# Patient Record
Sex: FEMALE | Race: WHITE | NOT HISPANIC OR LATINO | Employment: OTHER | URBAN - METROPOLITAN AREA
[De-identification: names, ages, dates, MRNs, and addresses within clinical notes are randomized per-mention and may not be internally consistent; named-entity substitution may affect disease eponyms.]

---

## 2024-02-28 ENCOUNTER — TELEPHONE (OUTPATIENT)
Dept: GASTROENTEROLOGY | Facility: CLINIC | Age: 69
End: 2024-02-28
Payer: COMMERCIAL

## 2024-02-28 NOTE — TELEPHONE ENCOUNTER
----- Message from Francesca Esquivel sent at 2/28/2024  3:53 PM CST -----  Contact: Dr Cespedes-FAX  2/28/24    Referral scanned into media mgr, dx Gastroparesis- 1st available appt 06/10/2024- phone .[phone] (home)     Thanks    Francesca ATKINSON

## 2024-02-29 ENCOUNTER — TELEPHONE (OUTPATIENT)
Dept: GASTROENTEROLOGY | Facility: CLINIC | Age: 69
End: 2024-02-29
Payer: COMMERCIAL

## 2024-02-29 NOTE — TELEPHONE ENCOUNTER
----- Message from Kate Becerra sent at 2/29/2024 11:32 AM CST -----  Regarding: Returning missed call  Contact: Pt @ [phone]  Pt is returning a missed call from someone in the office and is asking for a return call back soon. Thanks.         Reason for call: to get scheduled

## 2024-04-16 ENCOUNTER — TELEPHONE (OUTPATIENT)
Dept: GASTROENTEROLOGY | Facility: CLINIC | Age: 69
End: 2024-04-16
Payer: COMMERCIAL

## 2024-04-16 NOTE — TELEPHONE ENCOUNTER
----- Message from Ana Lilia Domínguez sent at 4/16/2024 12:10 PM CDT -----  Regarding: want  Contact: @ [phone]  Pt called in regards to speaking with someone in regard to the  doctor and how qualify she is for the patient before she come out of town .....Please call and adv @ [phone]

## 2024-04-16 NOTE — TELEPHONE ENCOUNTER
Patient has a questions regarding what we may be able to do. I spoke with Dr Linn. Advised patient that we are happy to see her, but we were not sure that we would have anything different than her referring MD. Patient was very appreciative as she does not want to drive over 200 miles unless she feels we can had a different treatment or medications.

## 2024-04-29 ENCOUNTER — OFFICE VISIT (OUTPATIENT)
Dept: GASTROENTEROLOGY | Facility: CLINIC | Age: 69
End: 2024-04-29
Payer: MEDICARE

## 2024-04-29 ENCOUNTER — PATIENT MESSAGE (OUTPATIENT)
Dept: GASTROENTEROLOGY | Facility: CLINIC | Age: 69
End: 2024-04-29

## 2024-04-29 VITALS
DIASTOLIC BLOOD PRESSURE: 85 MMHG | BODY MASS INDEX: 27.63 KG/M2 | HEART RATE: 82 BPM | SYSTOLIC BLOOD PRESSURE: 124 MMHG | HEIGHT: 66 IN | WEIGHT: 171.94 LBS

## 2024-04-29 DIAGNOSIS — G89.29 CHRONIC ABDOMINAL PAIN: Primary | ICD-10-CM

## 2024-04-29 DIAGNOSIS — R10.9 CHRONIC ABDOMINAL PAIN: Primary | ICD-10-CM

## 2024-04-29 PROCEDURE — 99215 OFFICE O/P EST HI 40 MIN: CPT | Mod: PBBFAC | Performed by: STUDENT IN AN ORGANIZED HEALTH CARE EDUCATION/TRAINING PROGRAM

## 2024-04-29 PROCEDURE — 99999 PR PBB SHADOW E&M-EST. PATIENT-LVL V: CPT | Mod: PBBFAC,GC,, | Performed by: STUDENT IN AN ORGANIZED HEALTH CARE EDUCATION/TRAINING PROGRAM

## 2024-04-29 PROCEDURE — 99204 OFFICE O/P NEW MOD 45 MIN: CPT | Mod: S$PBB,GC,, | Performed by: STUDENT IN AN ORGANIZED HEALTH CARE EDUCATION/TRAINING PROGRAM

## 2024-04-29 RX ORDER — DIAZEPAM 5 MG/1
5 TABLET ORAL ONCE
Qty: 1 TABLET | Refills: 0 | Status: SHIPPED | OUTPATIENT
Start: 2024-04-29 | End: 2024-04-29

## 2024-04-29 NOTE — PROGRESS NOTES
"GENERAL GI PATIENT INTAKE:    COVID symptoms in the last 7 days (runny nose, sore throat, congestion, cough, fever): No  PCP: Elio Underwood  If not PCP-  number given to establish [phone]: N/A    ALLERGIES REVIEWED:  Yes    CHIEF COMPLAINT:    Chief Complaint   Patient presents with    GI Problem       VITAL SIGNS:  /85   Pulse 82   Ht 5' 6" (1.676 m)   Wt 78 kg (171 lb 15.3 oz)   BMI 27.75 kg/m²      Change in medical, surgical, family or social history: No      REVIEWED MEDICATION LIST RECONCILED INCLUDING ABOVE MEDS:  Yes       "

## 2024-04-30 ENCOUNTER — PATIENT MESSAGE (OUTPATIENT)
Dept: GASTROENTEROLOGY | Facility: CLINIC | Age: 69
End: 2024-04-30
Payer: COMMERCIAL

## 2024-05-01 ENCOUNTER — PATIENT MESSAGE (OUTPATIENT)
Dept: GASTROENTEROLOGY | Facility: CLINIC | Age: 69
End: 2024-05-01
Payer: COMMERCIAL

## 2024-05-02 ENCOUNTER — TELEPHONE (OUTPATIENT)
Dept: GASTROENTEROLOGY | Facility: CLINIC | Age: 69
End: 2024-05-02
Payer: COMMERCIAL

## 2024-05-02 NOTE — PROGRESS NOTES
[facility]    Reason for visit: The encounter diagnosis was Chronic abdominal pain.  Referring Provider/PCP: Elio Underwood MD    History of Present Illness:  Kerrie Kirkland is a 69 y.o. 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 She was previously following with Dr. Cespedes (GI) in [place], [place]. She has been hospitalized multiple times this year for ileus/SBO, most recently 3/2024 with an ileus. Her abdominal pain has become worse just within this last year, requiring frequent ED visits and hospitalizations. She was referred to a surgeon in [place] but due to her complex surgical history, it was recommended that she be evaluated at [facility] for a second opinion.     PEndoHx:  EGD 11/01/2019: small hiatal hernia, widely patent previous surgical anastomosis.   Colonoscopy 11/01/2019: one 1mm polyp in the ascending colon, non-bleeding internal hemorrhoids.    Review of Systems   Constitutional:  Negative for fever, malaise/fatigue and weight loss.   Gastrointestinal:  Positive for abdominal pain and constipation (chronic). Negative for diarrhea, nausea and vomiting.     Social History     Socioeconomic History    Marital status:    Tobacco Use    Smoking status: Former     Types: Cigarettes    Smokeless tobacco: Never    Tobacco comments:     quit 1986   Substance and Sexual Activity    Alcohol use: No     Alcohol/week: 0.0 standard drinks of alcohol    Drug use: No     Social Determinants of Health     Financial Resource Strain: Medium Risk (4/27/2024)    Overall Financial Resource Strain (CARDIA)     Difficulty of Paying Living Expenses: Somewhat hard    Food Insecurity: Food Insecurity Present (4/27/2024)    Hunger Vital Sign     Worried About Running Out of Food in the Last Year: Sometimes true     Ran Out of Food in the Last Year: Patient declined   Transportation Needs: No Transportation Needs (4/27/2024)    PRAPARE - Transportation     Lack of Transportation (Medical): No     Lack of Transportation (Non-Medical): No   Physical Activity: Unknown (4/27/2024)    Exercise Vital Sign     Days of Exercise per Week: 3 days   Recent Concern: Physical Activity - Insufficiently Active (3/5/2024)    [facility]    Exercise Vital Sign     Days of Exercise per Week: 3 days     Minutes of Exercise per Session: 40 min   Stress: Stress Concern Present (4/27/2024)    [employer] - Occupational Stress Questionnaire     Feeling of Stress : To some extent   Housing Stability: Unknown (4/27/2024)    Housing Stability Vital Sign     Unable to Pay for Housing in the Last Year: No       Current Outpatient Medications on File Prior to Visit   Medication Sig Dispense Refill    buPROPion (WELLBUTRIN SR) 150 MG TBSR 12 hr tablet Take 150 mg by mouth 2 (two) times daily.      duloxetine (CYMBALTA) 60 MG capsule Take 60 mg by mouth 2 (two) times daily.      HYDROmorphone (DILAUDID) 4 MG tablet Take 4 mg by mouth 2 (two) times daily.      levothyroxine (SYNTHROID) 25 MCG tablet Take 25 mcg by mouth once daily.      linaCLOtide (LINZESS) 145 mcg Cap capsule Take 72 mcg by mouth before breakfast.      LYRICA 150 mg capsule Take 150 mg by mouth 4 (four) times daily.  5    morphine (MS CONTIN) 60 MG 12 hr tablet Take 60 mg by mouth 2 (two) times daily.      morphine (MSIR) 15 MG tablet Take 15 mg by mouth 3 (three) times daily.      omeprazole (PRILOSEC) 40 MG capsule Take 40 mg by mouth once daily.      ondansetron (ZOFRAN) 4 MG tablet Take 4 mg by mouth daily as needed.      oxymorphone (OPANA ER) 20 MG 12 hr  "tablet Take 20 mg by mouth 2 (two) times daily.      pantoprazole (PROTONIX) 40 MG tablet Take 40 mg by mouth once daily.      paroxetine (PAXIL) 10 MG tablet Take 10 mg by mouth once daily.      tizanidine (ZANAFLEX) 4 MG tablet Take 4 mg by mouth 2 (two) times daily.       No current facility-administered medications on file prior to visit.       Review of patient's allergies indicates:   Allergen Reactions    Nubain [nalbuphine] Anaphylaxis    Reglan [metoclopramide] Anxiety and Other (See Comments)     Pt described 3 day "katatonic" state after receiving Reglan      Physical Exam  Vitals reviewed.   Constitutional:       General: She is not in acute distress.     Appearance: She is not ill-appearing.   Eyes:      Extraocular Movements: Extraocular movements intact.   Cardiovascular:      Rate and Rhythm: Normal rate.   Pulmonary:      Effort: Pulmonary effort is normal.   Abdominal:      General: There is no distension.   Skin:     General: Skin is warm.   Neurological:      Mental Status: She is alert and oriented to person, place, and time. Mental status is at baseline.       Laboratory:  Lab Results   Component Value Date     12/02/2015    K 3.5 12/02/2015     12/02/2015    CO2 26 12/02/2015    BUN 14 12/02/2015    CREATININE 0.9 12/02/2015    CALCIUM 9.3 12/02/2015    ANIONGAP 13 12/02/2015    ESTGFRAFRICA >60.0 12/02/2015    EGFRNONAA >60.0 12/02/2015       Lab Results   Component Value Date    ALT 26 12/02/2015    AST 25 12/02/2015    ALKPHOS 77 12/02/2015    BILITOT 0.5 12/02/2015    ALBUMIN 4.3 12/02/2015       Lab Results   Component Value Date    WBC 11.46 12/02/2015    HGB 14.9 12/02/2015    HCT 42.5 12/02/2015    MCV 88 12/02/2015     12/02/2015     Imaging:  Multiple previous abdominal imaging.   Most recent CTAP with IV contrast from 4/16/2024: Postoperative changes in the upper abdomen with a few loops of dilated small bowel but no gross obstruction. CBD dilated, similar to " prior. Not mentioned above, are several prominent central mesenteric nodes but these are not significantly changed from prior   CTE 1/11/2024: Postsurgical changes as above suggesting prior Jaspreet-en-Y gastric bypass. Prominent caliber and mildly dilated proximal small bowel with no evidence of acute bowel obstruction. These findings and incidental several prior CTs and are probably chronic. No   acute abnormality of the gastrointestinal tract identified by CT enterography.     Colonic diverticulosis.     Assessment:  Kerrie Kirkland is a 69 y.o. female with complicated past surgical history and multiple hospital admissions for SBO/ileus previously being followed by GI in [place], [place] who presents to GI clinic for a second opinion of her chronic abdominal pain. She has had extensive abdominal imaging and workup by her GI physician in [place]. Unfortunately it seems that her chronic pain will be a long term issue. From a GI standpoint, aside from additional imaging and potentially re-scoping, there is not much else to offer. She would like to be evaluated by bariatric surgery at [facility]. In terms of her constipation, Linzess previously worked well for her but she has not had a BM in a few days. She does not currently feel obstructed but with her surgical history, it is always a possibility. Will obtain additional abdominal imaging and potentially increase her Linzess if needed.     Problems:  Chronic abdominal pain  Chronic constipation on Linzess  Colon polyps  Hx of SBO and ileus      Plan:  MRI abdomen with and without contrast to further evaluate CBD dilation and pain.   Attempt to get images of prior EGD. If MRI negative, may need to repeat.   Due for repeat colonoscopy this year. Will await results of MRI as she may need an EGD along with her colonoscopy.     An Moreau MD  Gastroenterology Fellow    Orders Placed This Encounter   Procedures    MRI Abdomen W WO Contrast_INC MRCP (XPD)    Celiac Disease Panel      This patient was discussed with Dr. Linn.

## 2024-05-02 NOTE — TELEPHONE ENCOUNTER
----- Message from Ana Lilia Toscanoens sent at 5/2/2024 10:52 AM CDT -----  Contact: @ [phone]  Pt called in regards to that she spent the day in the ED for the stomach pain and she wants is there anything she can do instead of having to go to the ED other then the Linzess .....Please call and adv @ [phone] or portal she need a answer or she is looking for her MRI date

## 2024-05-02 NOTE — PROGRESS NOTES
I have seen the patient, reviewed An Moreau MD the GI fellow's history and physical, assessment, and plan. I have personally interviewed and examined the patient at bedside and I discussed the case with the GI fellow and I agree with the findings and plan as documented in the fellow's note.

## 2024-05-10 ENCOUNTER — PATIENT MESSAGE (OUTPATIENT)
Dept: GASTROENTEROLOGY | Facility: CLINIC | Age: 69
End: 2024-05-10
Payer: COMMERCIAL

## 2024-05-24 ENCOUNTER — TELEPHONE (OUTPATIENT)
Dept: GASTROENTEROLOGY | Facility: CLINIC | Age: 69
End: 2024-05-24
Payer: COMMERCIAL

## 2024-05-24 NOTE — TELEPHONE ENCOUNTER
----- Message from Angelica Kirill sent at 5/24/2024  8:50 AM CDT -----  Regarding: Advise  Contact: [phone]  Patient is calling stating that she would like to let the provider know that the pain is better managed now since she stopped the mortility drugs per pt. She also stated that she wants to know if the provider wants her to still have the MRI done that is scheduled on 5/29. Please give pt a call to further discuss.

## 2024-05-28 ENCOUNTER — TELEPHONE (OUTPATIENT)
Dept: GASTROENTEROLOGY | Facility: CLINIC | Age: 69
End: 2024-05-28
Payer: COMMERCIAL

## 2024-05-28 NOTE — TELEPHONE ENCOUNTER
----- Message from Angela Santiago sent at 5/28/2024  8:25 AM CDT -----  Contact: [phone]  PATIENTCALL     Pt is calling to speak with Justyna in provider office she  is asking for a return call please call.

## 2024-08-11 ENCOUNTER — PATIENT MESSAGE (OUTPATIENT)
Dept: GASTROENTEROLOGY | Facility: CLINIC | Age: 69
End: 2024-08-11
Payer: COMMERCIAL

## 2024-08-12 ENCOUNTER — TELEPHONE (OUTPATIENT)
Dept: GASTROENTEROLOGY | Facility: CLINIC | Age: 69
End: 2024-08-12
Payer: COMMERCIAL

## 2024-08-12 NOTE — TELEPHONE ENCOUNTER
----- Message from Kerrie Nunes sent at 8/12/2024  9:20 AM CDT -----  Regarding: appt  Contact: [phone]  Patient is calling to speak Petrona because she is calling to get her MRI rescheduled please contact patient at [phone]

## 2024-09-03 ENCOUNTER — TELEPHONE (OUTPATIENT)
Dept: GASTROENTEROLOGY | Facility: CLINIC | Age: 69
End: 2024-09-03
Payer: COMMERCIAL

## 2024-09-03 NOTE — TELEPHONE ENCOUNTER
Spoke with Geeta. Patient has not yet had the MRI, but will schedule soon. They will send the report.

## 2024-09-03 NOTE — TELEPHONE ENCOUNTER
----- Message from Trisha Huertavern Smith sent at 9/3/2024 11:44 AM CDT -----  Contact: Kris with [facility] [phone]  [email or web address]     Patient is calling for Medical Advice regarding:Kris with [facility] with Dr. Cespedes [phone] calling pt took a MRI at her office and they want to know if you can use that mri.  Please call her back and advise.    How long has patient had these symptoms:    Pharmacy name and phone#:    Patient wants a call back or thru [facility]:callback    Comments:    Please advise patient replies from provider may take up to 48 hours.

## 2024-09-23 ENCOUNTER — TELEPHONE (OUTPATIENT)
Dept: GASTROENTEROLOGY | Facility: CLINIC | Age: 69
End: 2024-09-23
Payer: MEDICARE

## 2024-09-23 NOTE — TELEPHONE ENCOUNTER
Did you get the results of the MRI she had done in [place]?  She is still having a lot of symptoms.

## 2024-09-26 ENCOUNTER — TELEPHONE (OUTPATIENT)
Dept: GASTROENTEROLOGY | Facility: CLINIC | Age: 69
End: 2024-09-26
Payer: MEDICARE

## 2024-09-26 NOTE — TELEPHONE ENCOUNTER
----- Message from Kylee Cevallos sent at 9/26/2024 11:12 AM CDT -----  Regarding: Pt called states she wld like to speak with Petrona regarding an appt  Contact: [phone]  Name of Who is Calling:ALEX MOISE [[id number]]        What is the request in detail:Pt called states she wld like to speak with Petrona regarding an appt . Please advise         Can the clinic reply by [facility]:No        What Number to Call Back if not in [facility]: Telephone Information:  Mobile          [phone]

## 2024-11-01 ENCOUNTER — TELEPHONE (OUTPATIENT)
Dept: GASTROENTEROLOGY | Facility: CLINIC | Age: 69
End: 2024-11-01
Payer: MEDICARE

## 2024-11-01 NOTE — TELEPHONE ENCOUNTER
She is having acute pain attacks. She says that she is having pain, nausea, etc. I advised ER. But she does not really want to go to the ER. She is desperate for a solution to her problem.     I suggested that she discuss with her Pain Management MD as well. She recently saw him, but did not mention the abd pain to him.    I encouraged her to consider going to ER and discuss with her Pain Management

## 2024-11-01 NOTE — TELEPHONE ENCOUNTER
----- Message from Summer sent at 11/1/2024 10:56 AM CDT -----  Same Day Appointment Request     Caller Is Requesting A Same Day Appointment      Caller Declined First Available Appointment? PT CALLED STATING THAT THE PAIN HAS BECOME UNBEARABLE AGAIN. I ADDED HER TO THE WAIT LIST. SHE'S ASKING TO BE SEEN SOONER      Best Call Back Number? [phone]    Additional Information:       Thank You

## 2024-12-02 ENCOUNTER — OFFICE VISIT (OUTPATIENT)
Dept: GASTROENTEROLOGY | Facility: CLINIC | Age: 69
End: 2024-12-02
Payer: MEDICARE

## 2024-12-02 DIAGNOSIS — R10.9 ABDOMINAL PAIN, UNSPECIFIED ABDOMINAL LOCATION: Primary | Chronic | ICD-10-CM

## 2024-12-02 PROCEDURE — 99213 OFFICE O/P EST LOW 20 MIN: CPT | Mod: PBBFAC | Performed by: STUDENT IN AN ORGANIZED HEALTH CARE EDUCATION/TRAINING PROGRAM

## 2024-12-02 PROCEDURE — 99214 OFFICE O/P EST MOD 30 MIN: CPT | Mod: S$PBB,GC,, | Performed by: STUDENT IN AN ORGANIZED HEALTH CARE EDUCATION/TRAINING PROGRAM

## 2024-12-02 PROCEDURE — 99999 PR PBB SHADOW E&M-EST. PATIENT-LVL III: CPT | Mod: PBBFAC,GC,, | Performed by: STUDENT IN AN ORGANIZED HEALTH CARE EDUCATION/TRAINING PROGRAM

## 2024-12-02 RX ORDER — OXYCODONE AND ACETAMINOPHEN 10; 325 MG/1; MG/1
1 TABLET ORAL EVERY 8 HOURS PRN
COMMUNITY

## 2024-12-02 NOTE — PROGRESS NOTES
[facility]    Reason for visit: The encounter diagnosis was Abdominal pain, unspecified abdominal location.  Referring Provider/PCP: Elio Underwood MD    History of Present Illness:  Kerrie Kirkland is a 69 y.o. 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     She was previously following with Dr. Cespedes (GI) in [place], [place]. She has been hospitalized multiple times this year for ileus/SBO, most recently 3/2024 with an ileus. Her abdominal pain has become worse just within this last year, requiring frequent ED visits and hospitalizations. She was referred to a surgeon in [place] but due to her complex surgical history, it was recommended that she be evaluated at [facility] for a second opinion.     Her initial visit was with me in 4/2024. Recommended an MRI and EGD/Colon if findings are negative. MRI was done but at an outside facility, and I am not able to review the results.     Since our last visit, she has continued to experience diffuse abdominal pain which is present more than 80% of the month and at times debilitating. She did recently seen a pain medicine physician but abdominal pain not mentioned at the time of visit. She does suffer with chronic constipation, for which she was taking both motegrity and linzess, now just linzess but not scheduled.      PEndoHx:  EGD 11/01/2019: small hiatal hernia, widely patent previous surgical anastomosis.   Colonoscopy 11/01/2019: one 1mm polyp in the ascending colon, non-bleeding internal hemorrhoids.     Review of Systems   Gastrointestinal:  Positive for abdominal pain.       Social History      Socioeconomic History    Marital status:    Tobacco Use    Smoking status: Former     Types: Cigarettes    Smokeless tobacco: Never    Tobacco comments:     quit 1986   Substance and Sexual Activity    Alcohol use: No     Alcohol/week: 0.0 standard drinks of alcohol    Drug use: No     Social Drivers of Health     Financial Resource Strain: Medium Risk (4/27/2024)    Overall Financial Resource Strain (CARDIA)     Difficulty of Paying Living Expenses: Somewhat hard   Food Insecurity: Food Insecurity Present (4/27/2024)    Hunger Vital Sign     Worried About Running Out of Food in the Last Year: Sometimes true     Ran Out of Food in the Last Year: Patient declined   Transportation Needs: No Transportation Needs (4/27/2024)    PRAPARE - Transportation     Lack of Transportation (Medical): No     Lack of Transportation (Non-Medical): No   Physical Activity: Unknown (4/27/2024)    Exercise Vital Sign     Days of Exercise per Week: 3 days   Recent Concern: Physical Activity - Insufficiently Active (3/5/2024)    [facility] from [facility]    Exercise Vital Sign     Days of Exercise per Week: 3 days     Minutes of Exercise per Session: 40 min   Stress: Stress Concern Present (4/27/2024)    [employer] - Occupational Stress Questionnaire     Feeling of Stress : To some extent   Housing Stability: Unknown (4/27/2024)    Housing Stability Vital Sign     Unable to Pay for Housing in the Last Year: No       Current Outpatient Medications on File Prior to Visit   Medication Sig Dispense Refill    levothyroxine (SYNTHROID) 25 MCG tablet Take 25 mcg by mouth once daily.      linaCLOtide (LINZESS) 145 mcg Cap capsule Take 72 mcg by mouth before breakfast.      ondansetron (ZOFRAN) 4 MG tablet Take 4 mg by mouth daily as needed.      oxyCODONE-acetaminophen (PERCOCET)  mg per tablet Take 1 tablet by mouth every 8 (eight) hours as needed for Pain.       "pantoprazole (PROTONIX) 40 MG tablet Take 40 mg by mouth once daily.      tizanidine (ZANAFLEX) 4 MG tablet Take 4 mg by mouth 2 (two) times daily.       No current facility-administered medications on file prior to visit.       Review of patient's allergies indicates:   Allergen Reactions    Nubain [nalbuphine] Anaphylaxis    Reglan [metoclopramide] Anxiety and Other (See Comments)     Pt described 3 day "katatonic" state after receiving Reglan      Physical Exam  Vitals reviewed.   Constitutional:       General: She is not in acute distress.     Appearance: She is not ill-appearing.   Abdominal:      General: There is no distension or abdominal tenderness. No guarding. Scars noted from prior abdominal surgeries.   Neurological:      Mental Status: She is alert and oriented to person, place, and time. Mental status is at baseline.     Laboratory:  Lab Results   Component Value Date     12/02/2015    K 3.5 12/02/2015     12/02/2015    CO2 26 12/02/2015    BUN 14 12/02/2015    CREATININE 0.9 12/02/2015    CALCIUM 9.3 12/02/2015    ANIONGAP 13 12/02/2015    ESTGFRAFRICA >60.0 12/02/2015    EGFRNONAA >60.0 12/02/2015       Lab Results   Component Value Date    ALT 26 12/02/2015    AST 25 12/02/2015    ALKPHOS 77 12/02/2015    BILITOT 0.5 12/02/2015    ALBUMIN 4.3 12/02/2015       Lab Results   Component Value Date    WBC 11.46 12/02/2015    HGB 14.9 12/02/2015    HCT 42.5 12/02/2015    MCV 88 12/02/2015     12/02/2015     Imaging:  Multiple previous abdominal imaging.   Most recent CTAP with IV contrast from 4/16/2024: Postoperative changes in the upper abdomen with a few loops of dilated small bowel but no gross obstruction. CBD dilated, similar to prior. Not mentioned above, are several prominent central mesenteric nodes but these are not significantly changed from prior   CTE 1/11/2024: Postsurgical changes as above suggesting prior Jaspreet-en-Y gastric bypass. Prominent caliber and mildly dilated " proximal small bowel with no evidence of acute bowel obstruction. These findings and incidental several prior CTs and are probably chronic. No acute abnormality of the gastrointestinal tract identified by CT enterography. Colonic diverticulosis.     Assessment:  Kerrie Kirkland is a 69 y.o. female complicated past surgical abdominal history and multiple hospital admissions for SBO/ileus previously being followed by GI in [place], [place] who presents for chronic abdominal pain. She has had extensive abdominal imaging and workup by her GI physician in [place]. Unfortunately it seems that her chronic pain will be a long term issue. In terms of her constipation, Linzess does work but she is not taking it consistently.     Problems:  Chronic abdominal pain  Chronic constipation    Plan:  Will obtain US doppler and urine PBG to rule out alternative etiologies of chronic abdominal pain.  Arrange outpatient colonoscopy.   Instructed to take Linzess daily for optimal results. If this fails,then consider switching to Movantik.    An Moreau MD  Gastroenterology Fellow    Orders Placed This Encounter   Procedures    US Mesenteric Ischemia Study (xpd)    PBG, urine     This patient was discussed with Dr. Chávez.

## 2024-12-03 ENCOUNTER — PATIENT MESSAGE (OUTPATIENT)
Dept: GASTROENTEROLOGY | Facility: CLINIC | Age: 69
End: 2024-12-03
Payer: MEDICARE

## 2024-12-04 ENCOUNTER — TELEPHONE (OUTPATIENT)
Dept: ENDOSCOPY | Facility: HOSPITAL | Age: 69
End: 2024-12-04
Payer: MEDICARE

## 2024-12-04 NOTE — TELEPHONE ENCOUNTER
----- Message from Diamond sent at 2024  8:12 AM CST -----    ----- Message -----  From: An Moreau MD  Sent: 2024   4:23 PM CST  To: [facility]    Procedure: Colonoscopy    Diagnosis: Surveillance colonoscopy - Hx of colon polyps    Procedure Timin-12 weeks    Provider: Any GI provider    Location: Hospital Based ([facility],  [facility], [facility])    Additional Scheduling Information: No scheduling concerns    Prep Specifications:Extended/Constipation prep    Have you attached a patient to this message: yes

## 2024-12-06 ENCOUNTER — TELEPHONE (OUTPATIENT)
Dept: GASTROENTEROLOGY | Facility: CLINIC | Age: 69
End: 2024-12-06
Payer: MEDICARE

## 2024-12-06 NOTE — TELEPHONE ENCOUNTER
----- Message from Latoya sent at 12/6/2024  2:03 PM CST -----  Regarding: Rx Request        Name Of Caller:   Kerrie      Contact Preference:  [phone]       Nature of call:   Pt requesting scripts for promethazine (Phenergan) and ondansetron (ZOFRAN) instant relief sent to pharmacy below. She would like Dr. Moreau to write the Rx's moving forward.      [facility] - [address] [address]  Phone: [phone] Fax: [phone]

## 2024-12-07 ENCOUNTER — PATIENT MESSAGE (OUTPATIENT)
Dept: GASTROENTEROLOGY | Facility: CLINIC | Age: 69
End: 2024-12-07
Payer: MEDICARE

## 2024-12-09 ENCOUNTER — HOSPITAL ENCOUNTER (OUTPATIENT)
Dept: RADIOLOGY | Facility: HOSPITAL | Age: 69
Discharge: HOME | End: 2024-12-09
Attending: STUDENT IN AN ORGANIZED HEALTH CARE EDUCATION/TRAINING PROGRAM
Payer: MEDICARE

## 2024-12-09 DIAGNOSIS — R10.9 STOMACH ACHE: Primary | ICD-10-CM

## 2024-12-09 DIAGNOSIS — R10.9 ABDOMINAL PAIN, UNSPECIFIED ABDOMINAL LOCATION: ICD-10-CM

## 2024-12-09 PROCEDURE — 93976 VASCULAR STUDY: CPT | Mod: TC,PO

## 2024-12-10 ENCOUNTER — PATIENT MESSAGE (OUTPATIENT)
Dept: GASTROENTEROLOGY | Facility: CLINIC | Age: 69
End: 2024-12-10
Payer: MEDICARE

## 2024-12-12 ENCOUNTER — TELEPHONE (OUTPATIENT)
Dept: ENDOSCOPY | Facility: HOSPITAL | Age: 69
End: 2024-12-12
Payer: MEDICARE

## 2024-12-12 DIAGNOSIS — R10.9 ABDOMINAL PAIN, UNSPECIFIED ABDOMINAL LOCATION: Primary | ICD-10-CM

## 2024-12-12 NOTE — TELEPHONE ENCOUNTER
----- Message from Diamond sent at 2024  8:12 AM CST -----    ----- Message -----  From: An Moreau MD  Sent: 2024   4:23 PM CST  To: [facility] Patients    Procedure: Colonoscopy    Diagnosis: Surveillance colonoscopy - Hx of colon polyps    Procedure Timin-12 weeks    Provider: Any GI provider    Location: Hospital Based ([facility],  [facility], [facility])    Additional Scheduling Information: No scheduling concerns    Prep Specifications:Extended/Constipation prep    Have you attached a patient to this message: yes

## 2024-12-20 ENCOUNTER — TELEPHONE (OUTPATIENT)
Dept: ENDOSCOPY | Facility: HOSPITAL | Age: 69
End: 2024-12-20
Payer: MEDICARE

## 2024-12-20 VITALS — WEIGHT: 171 LBS | HEIGHT: 66 IN | BODY MASS INDEX: 27.48 KG/M2

## 2024-12-20 DIAGNOSIS — Z86.0100 HISTORY OF COLON POLYPS: Primary | ICD-10-CM

## 2024-12-20 DIAGNOSIS — Z12.11 ENCOUNTER FOR SCREENING COLONOSCOPY: Primary | ICD-10-CM

## 2024-12-20 RX ORDER — POLYETHYLENE GLYCOL 3350, SODIUM SULFATE ANHYDROUS, SODIUM BICARBONATE, SODIUM CHLORIDE, POTASSIUM CHLORIDE 236; 22.74; 6.74; 5.86; 2.97 G/4L; G/4L; G/4L; G/4L; G/4L
8 POWDER, FOR SOLUTION ORAL ONCE
Qty: 8000 ML | Refills: 0 | Status: SHIPPED | OUTPATIENT
Start: 2024-12-20 | End: 2024-12-20

## 2024-12-20 NOTE — TELEPHONE ENCOUNTER
Referral for procedure from Telephone call - direct access patient      Spoke to patient to schedule procedure(s) Colonoscopy       Physician to perform procedure(s) Dr. TIKA Lyle  Date of Procedure (s) 2/14/25  Arrival Time 10:00 AM  Time of Procedure(s) 11:00 AM   Location of Procedure(s) [place] 4th Floor  Type of Rx Prep sent to patient: PEG/EXTENDED  Instructions provided to patient via [facility]    Patient was informed on the following information and verbalized understanding. Screening questionnaire reviewed with patient and complete. If procedure requires anesthesia, a responsible adult needs to be present to accompany the patient home, patient cannot drive after receiving anesthesia. Appointment details are tentative, especially check-in time. Patient will receive a prep-op call 7 days prior to confirm check-in time for procedure. If applicable the patient should contact their pharmacy to verify Rx for procedure prep is ready for pick-up. Patient was advised to call the scheduling department at [phone] if pharmacy states no Rx is available. Patient was advised to call the endoscopy scheduling department if any questions or concerns arise.      [facility] Endoscopy Scheduling Department

## 2024-12-23 ENCOUNTER — PATIENT MESSAGE (OUTPATIENT)
Dept: GASTROENTEROLOGY | Facility: CLINIC | Age: 69
End: 2024-12-23
Payer: MEDICARE

## 2025-01-21 ENCOUNTER — TELEPHONE (OUTPATIENT)
Dept: ENDOSCOPY | Facility: HOSPITAL | Age: 70
End: 2025-01-21
Payer: MEDICARE
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
## 2025-01-24 ENCOUNTER — TELEPHONE (OUTPATIENT)
Dept: ENDOSCOPY | Facility: HOSPITAL | Age: 70
End: 2025-01-24
Payer: MEDICARE

## 2025-01-24 RX ORDER — DEXLANSOPRAZOLE 60 MG/1
CAPSULE, DELAYED RELEASE ORAL
COMMUNITY

## 2025-01-24 RX ORDER — ONDANSETRON 4 MG/1
2 TABLET, ORALLY DISINTEGRATING ORAL EVERY 6 HOURS PRN
COMMUNITY
Start: 2024-12-30

## 2025-01-24 RX ORDER — ALPRAZOLAM 0.5 MG/1
TABLET ORAL
COMMUNITY
Start: 2024-08-20

## 2025-01-24 RX ORDER — NAPROXEN SODIUM 220 MG
TABLET ORAL
COMMUNITY

## 2025-01-24 RX ORDER — LISINOPRIL 20 MG/1
TABLET ORAL
COMMUNITY

## 2025-01-24 RX ORDER — BUPROPION HYDROCHLORIDE 150 MG/1
1 TABLET, EXTENDED RELEASE ORAL 2 TIMES DAILY
COMMUNITY

## 2025-01-24 RX ORDER — PHENYTOIN SODIUM 100 MG/1
CAPSULE, EXTENDED RELEASE ORAL
COMMUNITY

## 2025-01-24 RX ORDER — AMLODIPINE BESYLATE 5 MG/1
TABLET ORAL
COMMUNITY

## 2025-01-24 RX ORDER — NABUMETONE 500 MG/1
500 TABLET, FILM COATED ORAL 2 TIMES DAILY PRN
COMMUNITY

## 2025-01-24 RX ORDER — AZELASTINE 1 MG/ML
SPRAY, METERED NASAL
COMMUNITY

## 2025-01-24 RX ORDER — CARBAMAZEPINE 200 MG/1
TABLET ORAL
COMMUNITY

## 2025-01-24 RX ORDER — LOSARTAN POTASSIUM 25 MG/1
1 TABLET ORAL EVERY MORNING
COMMUNITY

## 2025-01-24 RX ORDER — ALBUTEROL SULFATE 90 UG/1
INHALANT RESPIRATORY (INHALATION)
COMMUNITY

## 2025-01-24 RX ORDER — MIRTAZAPINE 7.5 MG/1
TABLET, FILM COATED ORAL
COMMUNITY

## 2025-01-24 RX ORDER — GABAPENTIN 600 MG/1
TABLET ORAL
COMMUNITY

## 2025-01-24 RX ORDER — AMOXICILLIN 250 MG
1 CAPSULE ORAL 2 TIMES DAILY
COMMUNITY
Start: 2024-02-28 | End: 2025-02-27

## 2025-01-24 RX ORDER — POLYETHYLENE GLYCOL 3350, SODIUM SULFATE ANHYDROUS, SODIUM BICARBONATE, SODIUM CHLORIDE, POTASSIUM CHLORIDE 236; 22.74; 6.74; 5.86; 2.97 G/4L; G/4L; G/4L; G/4L; G/4L
POWDER, FOR SOLUTION ORAL
COMMUNITY
Start: 2024-12-20

## 2025-01-24 RX ORDER — NIZATIDINE 300 MG/1
CAPSULE ORAL
COMMUNITY

## 2025-01-24 RX ORDER — METHOCARBAMOL 500 MG/1
TABLET, FILM COATED ORAL
COMMUNITY

## 2025-01-24 RX ORDER — DAPAGLIFLOZIN 10 MG/1
10 TABLET, FILM COATED ORAL
COMMUNITY
Start: 2023-12-29

## 2025-01-24 RX ORDER — CELECOXIB 200 MG/1
CAPSULE ORAL
COMMUNITY

## 2025-01-24 RX ORDER — DULOXETIN HYDROCHLORIDE 60 MG/1
1 CAPSULE, DELAYED RELEASE ORAL DAILY
COMMUNITY

## 2025-01-24 RX ORDER — AMITRIPTYLINE HYDROCHLORIDE 50 MG/1
TABLET, FILM COATED ORAL
COMMUNITY
Start: 2024-09-16

## 2025-01-24 RX ORDER — HYDRALAZINE HYDROCHLORIDE 10 MG/1
10 TABLET, FILM COATED ORAL
COMMUNITY
Start: 2023-12-29

## 2025-01-24 RX ORDER — POTASSIUM CHLORIDE 750 MG/1
CAPSULE, EXTENDED RELEASE ORAL
COMMUNITY

## 2025-01-24 NOTE — TELEPHONE ENCOUNTER
Referral for procedure from [facility] (see t/e 12/20/25)      Spoke to pt to reschedule procedure(s) Colonoscopy (moved to hospital-based location per MD order)       Physician to perform procedure(s) Dr. ERIN Parrish  Date of Procedure (s) 3/10/25  Arrival Time 12:15 PM  Time of Procedure(s) 1:15 PM   Location of Procedure(s) [address]  Type of Rx Prep sent to patient: PEG extended  Instructions provided to patient via [facility]    Patient was informed on the following information and verbalized understanding. Screening questionnaire reviewed with patient and complete. If procedure requires anesthesia, a responsible adult needs to be present to accompany the patient home, patient cannot drive after receiving anesthesia. Appointment details are tentative, especially check-in time. Patient will receive a prep-op call 7 days prior to confirm check-in time for procedure. If applicable the patient should contact their pharmacy to verify Rx for procedure prep is ready for pick-up. Patient was advised to call the scheduling department at [phone] if pharmacy states no Rx is available. Patient was advised to call the endoscopy scheduling department if any questions or concerns arise.       Endoscopy Scheduling Department    
61

## 2025-01-28 ENCOUNTER — PATIENT MESSAGE (OUTPATIENT)
Dept: GASTROENTEROLOGY | Facility: CLINIC | Age: 70
End: 2025-01-28
Payer: MEDICARE

## 2025-02-06 ENCOUNTER — TELEPHONE (OUTPATIENT)
Dept: GASTROENTEROLOGY | Facility: CLINIC | Age: 70
End: 2025-02-06
Payer: MEDICARE

## 2025-02-06 NOTE — TELEPHONE ENCOUNTER
----- Message from Samia sent at 2/6/2025 12:09 PM CST -----  Regarding: Refill Request  Contact: Kerrie  REFILL REQUEST    Who Called: Kerrie     Refill or New Rx: Refill     Rx Name and Strength:linaCLOtide (LINZESS) 145 mcg Cap capsule  And   Phenergan   And  amitriptyline (ELAVIL) 50 MG tablet    Preferred Pharmacy with phone number:    [facility] [address] [address]  Phone: [phone] Fax: [phone]    Local or Mail Order: Local     Would the patient rather a call back or a response via [facility]? Both       Pt states Afraid of the drug but going through a hard time right now. Ongoing stomach issues and won't be taking it regularly but will take it when she needs it   amitriptyline (ELAVIL) 50 MG tablet

## 2025-02-07 ENCOUNTER — PATIENT MESSAGE (OUTPATIENT)
Dept: GASTROENTEROLOGY | Facility: CLINIC | Age: 70
End: 2025-02-07
Payer: MEDICARE

## 2025-02-07 RX ORDER — AMITRIPTYLINE HYDROCHLORIDE 50 MG/1
50 TABLET, FILM COATED ORAL NIGHTLY
Qty: 90 TABLET | Refills: 0 | Status: SHIPPED | OUTPATIENT
Start: 2025-02-07 | End: 2025-05-08

## 2025-02-10 ENCOUNTER — TELEPHONE (OUTPATIENT)
Dept: GASTROENTEROLOGY | Facility: CLINIC | Age: 70
End: 2025-02-10
Payer: MEDICARE

## 2025-02-10 NOTE — TELEPHONE ENCOUNTER
----- Message from Christin sent at 2/10/2025  4:18 PM CST -----  Regarding: PT ADVICE  Contact: PT@[phone]  Pt is returning a missed call from someone in the office and is asking for a return call back soon. Thanks.     Reason for call:MISS CALL      Patient's DX:     Patient requesting call back or [facility] msg: PT@[phone]

## 2025-02-10 NOTE — TELEPHONE ENCOUNTER
----- Message from Angela sent at 2/10/2025  8:29 AM CST -----  Type:  RX Refill Request    Who Called: Ms Bosch friend of Ms Sy   Refill or New Rx:refill   RX Name and Strength:Phenergan    Preferred Pharmacy with phone number:  [facility] - [place], [place] - [address] [place] [address]  Phone: [phone] Fax: [phone]  Local or Mail Order:local   Ordering Provider:Dr Lehman  Would the patient rather a call back or a response via [facility]? Call   Best Call Back Number: [phone]  Additional Information:

## 2025-02-11 RX ORDER — PROMETHAZINE HYDROCHLORIDE 25 MG/1
25 TABLET ORAL EVERY 6 HOURS PRN
Qty: 30 TABLET | Refills: 0 | Status: SHIPPED | OUTPATIENT
Start: 2025-02-11 | End: 2025-03-13

## 2025-02-19 ENCOUNTER — TELEPHONE (OUTPATIENT)
Dept: GASTROENTEROLOGY | Facility: CLINIC | Age: 70
End: 2025-02-19
Payer: MEDICARE

## 2025-02-19 NOTE — TELEPHONE ENCOUNTER
----- Message from Summer sent at 2/19/2025  8:39 AM CST -----  .Type:  Patient Call BackWho Called: PT Does the patient know what this is regarding?: PT WOULD LIKE TO SPEAK WITH MO COLE ABOUT TEST RESULTS AND SOME OTHER THINGS THAT WILL BE DISCUSSED DURING THE RETURNED CALL TO HER.  Would the patient rather a call back YES Best Call Back Number: [phone] Information: Thank You

## 2025-02-19 NOTE — TELEPHONE ENCOUNTER
FYI-She is going to fax some labs to you.    She is concerned about dehydration with her upcoming procedure.

## 2025-02-20 NOTE — TELEPHONE ENCOUNTER
----- Message from Michelle sent at 2/20/2025  9:06 AM CST -----  Regarding: pt advice  Contact: [phone]  Patient requesting a callback, patient did not want to disclose reason for call. [phone] Calling to speak to Petrona

## 2025-02-20 NOTE — TELEPHONE ENCOUNTER
Wants to pursue every avenue to determine why she is having pain.  She wants to every test that you deem necessary.

## 2025-02-21 ENCOUNTER — PATIENT MESSAGE (OUTPATIENT)
Dept: GASTROENTEROLOGY | Facility: CLINIC | Age: 70
End: 2025-02-21
Payer: MEDICARE

## 2025-02-25 ENCOUNTER — TELEPHONE (OUTPATIENT)
Dept: GASTROENTEROLOGY | Facility: CLINIC | Age: 70
End: 2025-02-25
Payer: MEDICARE

## 2025-02-25 NOTE — TELEPHONE ENCOUNTER
I spoke with patient. CTA is scheduled.     She wants to know what is the alternative plan is she is not able to tolerate the colon prep. She says it is critical that she know what the option is so she can cancel the hotel room.

## 2025-02-25 NOTE — TELEPHONE ENCOUNTER
----- Message from Angela sent at 2/25/2025  9:13 AM CST -----  Type:  Patient Returning CallWho Called:Ms Sy Who Left Message for Patient:Wilfredo the patient know what this is regarding?:yes Would the patient rather a call back or a response via [facility]? Call Best Call Back Number: [phone] Information: please call back

## 2025-02-26 NOTE — TELEPHONE ENCOUNTER
----- Message from Samia sent at 2/26/2025 10:44 AM CST -----  Regarding: Consult Advisory  Contact: Kerrie  CONSULT/ADVISORYName of Caller: Haider Preference:  [phone] of CallPt called pharmacy to see if they they had the prep for the upcoming procedure. They haven't received anything. Pt needs you all to send it over so that she may retrieve it in order to prep for procedure.  Please give pt a call when the prep is put in.[facility] - [place], [place] - [address] [place] [address]: [phone] Fax: [phone]

## 2025-02-26 NOTE — TELEPHONE ENCOUNTER
----- Message from Summer sent at 2/26/2025 11:22 AM CST -----  Patient Returning CallWho Called? PT Who Left Message for Patient? MO MCGUIRE TDoes the patient know what this is regarding?: MISSED CALL Would the patient rather a call back?  PLEASE SEE CHART MESSAGE  Best Call Back Number:  [phone] Information:  THANK YOU

## 2025-03-05 ENCOUNTER — TELEPHONE (OUTPATIENT)
Dept: GASTROENTEROLOGY | Facility: CLINIC | Age: 70
End: 2025-03-05
Payer: MEDICARE

## 2025-03-05 NOTE — TELEPHONE ENCOUNTER
----- Message from Jaya sent at 3/5/2025  9:30 AM CST -----  Regarding: Rescheduling Request  Contact: [phone]  Rescheduling RequestAppt Type:  EpDate/Time Preference: Next availableCaller Name:PtContact Preference: [phone] Comment: Pt currently in hospitalPlease Call to Advise,Thank you.

## 2025-03-10 ENCOUNTER — TELEPHONE (OUTPATIENT)
Dept: ENDOSCOPY | Facility: HOSPITAL | Age: 70
End: 2025-03-10
Payer: MEDICARE

## 2025-03-10 NOTE — TELEPHONE ENCOUNTER
Contacted patient to reschedule colonoscopy.  Patient is not available to talk at this time and would like to call back.  Direct line provided.

## 2025-03-19 ENCOUNTER — HOSPITAL ENCOUNTER (OUTPATIENT)
Dept: RADIOLOGY | Facility: HOSPITAL | Age: 70
Discharge: HOME | End: 2025-03-19
Attending: STUDENT IN AN ORGANIZED HEALTH CARE EDUCATION/TRAINING PROGRAM
Payer: MEDICARE

## 2025-03-19 DIAGNOSIS — R10.9 ABDOMINAL PAIN, UNSPECIFIED ABDOMINAL LOCATION: ICD-10-CM

## 2025-03-19 LAB
CREAT SERPL-MCNC: 0.7 MG/DL (ref 0.5–1.4)
SAMPLE: NORMAL

## 2025-03-19 PROCEDURE — 25500020 PHARM REV CODE 255: Performed by: STUDENT IN AN ORGANIZED HEALTH CARE EDUCATION/TRAINING PROGRAM

## 2025-03-19 PROCEDURE — 74174 CTA ABD&PLVS W/CONTRAST: CPT | Mod: TC

## 2025-03-19 PROCEDURE — 74174 CTA ABD&PLVS W/CONTRAST: CPT | Mod: 26,,, | Performed by: RADIOLOGY

## 2025-03-19 RX ADMIN — IOHEXOL 80 ML: 350 INJECTION, SOLUTION INTRAVENOUS at 04:03

## 2025-03-19 NOTE — PLAN OF CARE
Radiology Contrast Extravasation:    During power injection of contrast for a CTA of the abdomen and pelvis, approximately 70 cc of Omnipaque 350 contrast extravasated into the soft tissues of the pt's left upper extremity. At the time of examination, the pt remained neurovascularly intact with no pain at the site of extravasation. No evidence for compartment syndrome. Patient reports she has had these contrast extravasations occur in the past.     Patient educated on the typical course of contrast extravasations. Advised to elevate the extremity as tolerated. Strict return precautions provided, including descriptions of compartment syndrome symptoms.     Jacob Muñiz MD PGY4  Department of Radiology  [facility]

## 2025-03-24 ENCOUNTER — RESULTS FOLLOW-UP (OUTPATIENT)
Dept: GASTROENTEROLOGY | Facility: HOSPITAL | Age: 70
End: 2025-03-24

## 2025-03-27 ENCOUNTER — PATIENT MESSAGE (OUTPATIENT)
Dept: GASTROENTEROLOGY | Facility: CLINIC | Age: 70
End: 2025-03-27
Payer: MEDICARE

## 2025-03-27 ENCOUNTER — TELEPHONE (OUTPATIENT)
Dept: GASTROENTEROLOGY | Facility: CLINIC | Age: 70
End: 2025-03-27
Payer: MEDICARE

## 2025-03-27 NOTE — TELEPHONE ENCOUNTER
----- Message from Jaya sent at 3/27/2025  1:54 PM CDT -----  Regarding: Consult/Advisory/Wrong Imaging  Contact: [phone]  Consult/Advisory/Wrong ImagingName Of Caller: AnaidParkerBaylor University Medical CenterContact Preference:[phone] of call: Pt believes her CTA Pelvis Imaging done on 03/20 , Gave to [facility]. Pt believes the images are not hers, due to the imaging findings stating she had had a cabbage done(which hasn't ever been done), she also never had a bypass surgery or a hernia surgery done,and it states she has left atrium enlargement . Pt states she does have a disformity but never been told there was a enlargement , also post op change due to the cabbage but she has never had the surgery done. The findings also Shows she has a normal gallbladder on the report but her gall bladder was removed . Based off the finding she is be referred to a pulmonologist to get a CT of her Chest but she thinks this is the wrong imaging, pt has tried to contact provider's office , administrative and imaging and no one has tried to contact her. Pt is extremely upset and is requesting someone giver her a callPlease Call to Advise,Thank you.

## 2025-03-27 NOTE — TELEPHONE ENCOUNTER
Copied from CRM #[id number]. Topic: General Inquiry - Information Request  >> Mar 27, 2025 11:00 AM Christin wrote:  Pt is requesting a  call from someone in the office and is asking for a return call back soon. Thanks.     Reason for call:discuss plan of care      Patient's DX:     Patient requesting call back or [facility] [phone]

## 2025-03-27 NOTE — TELEPHONE ENCOUNTER
Spoke with nurse at [facility]. Radiology is looking at the report and will do an addendum.

## 2025-03-27 NOTE — TELEPHONE ENCOUNTER
----- Message from Natasha sent at 3/27/2025  4:05 PM CDT -----  Contact: Anaid @ [phone] ext [phone]  Anaid from [facility] calling regarding Patient Advice (message) for #is calling to speak with Petrona concerning pt, asking for call back

## 2025-04-14 ENCOUNTER — PATIENT MESSAGE (OUTPATIENT)
Dept: GASTROENTEROLOGY | Facility: CLINIC | Age: 70
End: 2025-04-14
Payer: MEDICARE

## 2025-04-17 ENCOUNTER — TELEPHONE (OUTPATIENT)
Dept: GASTROENTEROLOGY | Facility: CLINIC | Age: 70
End: 2025-04-17
Payer: MEDICARE

## 2025-04-17 NOTE — TELEPHONE ENCOUNTER
----- Message from Ramila sent at 4/17/2025 11:07 AM CDT -----  Name of Who is Calling: Pt  What is the request in detail:Pt would like a call back in regards to why pt called was not returned and pt is in the hosp. Please advise thank you   Can the clinic reply by [facility]:no  What Number to [facility] Back [facility]:Telephone Information:Mobile          [phone]

## 2025-04-17 NOTE — TELEPHONE ENCOUNTER
"She is in the hospital twice within a month. She says that he also has been to the ER 2 additional times as well.  She is having severe pain and nausea, etc.  She says that "She can't keep doing this" She says someone has to do something for her quality of life.  "

## 2025-04-17 NOTE — TELEPHONE ENCOUNTER
----- Message from Ramila sent at 4/17/2025 11:07 AM CDT -----  Name of Who is Calling: Pt  What is the request in detail:Pt would like a call back in regards to why pt called was not returned and pt is in the hosp. Please advise thank you   Can the clinic reply by [facility]:no  What Number to Call Back [facility]:Telephone Information:Mobile          [phone]

## 2025-04-21 ENCOUNTER — PATIENT MESSAGE (OUTPATIENT)
Dept: GASTROENTEROLOGY | Facility: CLINIC | Age: 70
End: 2025-04-21
Payer: MEDICARE

## 2025-04-21 ENCOUNTER — TELEPHONE (OUTPATIENT)
Dept: GASTROENTEROLOGY | Facility: CLINIC | Age: 70
End: 2025-04-21
Payer: MEDICARE

## 2025-04-21 NOTE — TELEPHONE ENCOUNTER
----- Message from Lorri sent at 4/21/2025 11:00 AM CDT -----  Regarding: Patient concerns  Contact: pt [phone]  Type:  Needs Medical AdviceWho Called: Kerrie called in regards to asking for Justyna Cao to call her she says it is URGENTWould the patient rather a call back or a response via [facility]? Call back Best Call Back Number: pt [phone] Additional Information:

## 2025-04-22 ENCOUNTER — PATIENT MESSAGE (OUTPATIENT)
Dept: GASTROENTEROLOGY | Facility: CLINIC | Age: 70
End: 2025-04-22
Payer: MEDICARE

## 2025-04-22 DIAGNOSIS — Z98.84 HX OF GASTRIC BYPASS: Primary | ICD-10-CM

## 2025-04-22 NOTE — TELEPHONE ENCOUNTER
----- Message from Gregorio sent at 4/22/2025  1:52 PM CDT -----  Regarding: Call requested  Contact: [phone]  Hi,Who called:[facility] Jur[facility]Called to request a call from the office to discuss getting appt.  Pls call her at [phone].Provider's name:Additional Information: Thank you.

## 2025-04-24 ENCOUNTER — TELEPHONE (OUTPATIENT)
Dept: GASTROENTEROLOGY | Facility: CLINIC | Age: 70
End: 2025-04-24
Payer: MEDICARE

## 2025-04-24 ENCOUNTER — PATIENT MESSAGE (OUTPATIENT)
Dept: GASTROENTEROLOGY | Facility: CLINIC | Age: 70
End: 2025-04-24
Payer: MEDICARE

## 2025-04-24 NOTE — TELEPHONE ENCOUNTER
----- Message from Jeri sent at 4/24/2025  2:29 PM CDT -----  Regarding: pt advice  Contact: [phone]  .Type:  Needs Medical AdviceWho Called: pt Symptoms (please be specific): asking to speak to Petrona in regards to being in severe pain and probably headed back to the hospital so she needs to talk to her  . Pt stated this is urgent Would the patient rather a call back or a response via [facility]? Call Best Call Back Number: [phone] Information: n/a

## 2025-04-29 ENCOUNTER — PATIENT MESSAGE (OUTPATIENT)
Dept: GASTROENTEROLOGY | Facility: CLINIC | Age: 70
End: 2025-04-29
Payer: MEDICARE

## 2025-05-01 ENCOUNTER — PATIENT MESSAGE (OUTPATIENT)
Dept: BARIATRICS | Facility: CLINIC | Age: 70
End: 2025-05-01
Payer: MEDICARE

## 2025-05-09 ENCOUNTER — TELEPHONE (OUTPATIENT)
Dept: BARIATRICS | Facility: CLINIC | Age: 70
End: 2025-05-09
Payer: MEDICARE

## 2025-05-23 ENCOUNTER — PATIENT MESSAGE (OUTPATIENT)
Dept: GASTROENTEROLOGY | Facility: CLINIC | Age: 70
End: 2025-05-23
Payer: MEDICARE

## 2025-05-30 ENCOUNTER — TELEPHONE (OUTPATIENT)
Dept: GASTROENTEROLOGY | Facility: CLINIC | Age: 70
End: 2025-05-30
Payer: MEDICARE

## 2025-05-30 ENCOUNTER — PATIENT MESSAGE (OUTPATIENT)
Dept: GASTROENTEROLOGY | Facility: CLINIC | Age: 70
End: 2025-05-30
Payer: MEDICARE

## 2025-06-03 RX ORDER — PROMETHAZINE HYDROCHLORIDE 25 MG/1
25 TABLET ORAL EVERY 6 HOURS PRN
Qty: 15 TABLET | Refills: 0 | Status: SHIPPED | OUTPATIENT
Start: 2025-06-03

## 2025-06-09 ENCOUNTER — PATIENT MESSAGE (OUTPATIENT)
Dept: GASTROENTEROLOGY | Facility: CLINIC | Age: 70
End: 2025-06-09
Payer: MEDICARE

## 2025-06-09 ENCOUNTER — TELEPHONE (OUTPATIENT)
Dept: GASTROENTEROLOGY | Facility: CLINIC | Age: 70
End: 2025-06-09
Payer: MEDICARE

## 2025-06-09 NOTE — TELEPHONE ENCOUNTER
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 She wants to know if you can recommend someone.    Also , she is getting a J-tube placed.

## 2025-06-09 NOTE — TELEPHONE ENCOUNTER
Copied from CRM #[id number]. Topic: Appointments - Appointment Access  >> Jun 9, 2025  9:50 AM Jaya wrote:  Scheduling Request    Appt Type:  Ep    Date/Time Preference: Next available    Caller Name:Pt    Contact Preference:[phone]     Comment:to discuss surgery, asking for Justyna to give her a call      Please Call to Advise,Thank you.

## 2025-06-10 NOTE — TELEPHONE ENCOUNTER
Copied from CRM #[id number]. Topic: Medications - Medication Refill  >> José Miguel 10, 2025 11:22 AM Fiordaliza wrote:  Is this a Refill or New Rx:Refill       Rx Name and Strength:promethazine (PHENERGAN) 25 MG tablet      Preferred Pharmacy with phone number:  [facility] - [place], [place] - [address] [place] [address]  Phone: [phone] Fax: [phone]           Communication Preference:[phone] (home)       Additional Information:   Pt is sin need of refill a month supply pt is still having severe nausea and if there is any problems Pt would like for Petrona to call back

## 2025-06-11 ENCOUNTER — TELEPHONE (OUTPATIENT)
Dept: GASTROENTEROLOGY | Facility: CLINIC | Age: 70
End: 2025-06-11
Payer: MEDICARE

## 2025-06-11 NOTE — TELEPHONE ENCOUNTER
Copied from CRM #[id number]. Topic: Medications - Medication Refill  >> Jun 11, 2025  2:33 PM Jeri wrote:  .Type:  RX Refill Request    Who Called: pt   Refill or New Rx:refill  RX Name and Strength:promethazine (PHENERGAN) 25 MG tablet  How is the patient currently taking it? (ex. 1XDay):in chart   Is this a 30 day or 90 day RX:in chart   Preferred Pharmacy with phone number:..  [facility] [place] [address]  Phone: [phone] Fax: [phone]    Local or Mail Order:local   Ordering Provider:this one   Would the patient rather a call back or a response via [facility]? Call   Best Call Back Number:[phone]  Additional Information: pt stated she is really sick . She has made a request and knows it could take a couple days, but she is really sick and this medication is the only thing that is helping.

## 2025-06-12 NOTE — TELEPHONE ENCOUNTER
Copied from CRM #[id number]. Topic: General Inquiry - Patient Advice  >> Jun 12, 2025 10:27 AM Jeri wrote:  .Type:  Needs Medical Advice    Who Called: pt   Symptoms (please be specific): asking to speak to Petrona as quickly as possible  , will discuss when she calls   Would the patient rather a call back or a response via [facility]? Call   Best Call Back Number: [phone]

## 2025-06-12 NOTE — TELEPHONE ENCOUNTER
Spoke with patient. I told her that you did not have a specific recommendation for a surgeon. She wanted me to readdress with you as she feels that you should have be able to give a recommendation.     She asked about the Phenergan. I told her that you were not comfortable refill it. I did advise that she should contact her PCP. She wants to know why you will not give it to her. She wants to know if you cannot give her Phenergan, what other way can her nausea be treated.     She says she is hesitant to do the TCA , but she has taken it sparely over the last couple of weeks. But she is concerned about taking. She also says it is not helping at all.

## 2025-06-13 ENCOUNTER — PATIENT MESSAGE (OUTPATIENT)
Dept: GASTROENTEROLOGY | Facility: CLINIC | Age: 70
End: 2025-06-13
Payer: MEDICARE

## 2025-06-13 ENCOUNTER — TELEPHONE (OUTPATIENT)
Dept: GASTROENTEROLOGY | Facility: CLINIC | Age: 70
End: 2025-06-13
Payer: MEDICARE

## 2025-06-13 NOTE — TELEPHONE ENCOUNTER
Copied from CRM #[id number]. Topic: Medications - New Medication Request  >> Jun 13, 2025  4:01 PM Angela wrote:  Type:  RX Refill Request    Who Called: Ms Kirkland   Refill or New Rx:new prescription   RX Name and Strength: phenergan  Preferred Pharmacy with phone number:  [facility] - [place], [place] - [address] [place] [address]  Phone: [phone] Fax: [phone]     Local or Mail Order:local   Ordering Provider:Dr Moreau   Would the patient rather a call back or a response via [facility]? Call   Best Call Back Number:[phone]   Additional Information: She states that she called the pharmacy to check for the medication ans they state they did not get the refill. She also, states she will consult with someone locally about this please call referring to the providers message sent today

## 2025-06-13 NOTE — TELEPHONE ENCOUNTER
Copied from CRM #[id number]. Topic: Medications - New Medication Request  >> Jun 13, 2025 10:43 AM Angela wrote:  Type:  RX Refill Request    Who Called: Ms Kirkland   Refill or New Rx:New   RX Name and Strength:Roland  Preferred Pharmacy with phone number:  [facility] - [place], [place] - [address] [place] [address]  Phone: [phone] Fax: [phone]   Local or Mail Order:local   Ordering Provider:Dr Moreau   Would the patient rather a call back or a response via [facility]? Call   Best Call Back Number:[phone]  Additional Information: she states that she has been calling since Monday to get something for nausea and she still has not gotten it sent to the pharmacy she is asking for a call back to let her know what the problem is with getting the medication please call before the end of the day. She states she has also, sent messages through the portal please read all messages

## 2025-06-16 ENCOUNTER — TELEPHONE (OUTPATIENT)
Dept: GASTROENTEROLOGY | Facility: CLINIC | Age: 70
End: 2025-06-16
Payer: MEDICARE

## 2025-06-16 NOTE — TELEPHONE ENCOUNTER
Copied from CRM #[id number]. Topic: General Inquiry - Patient Advice  >> Jun 16, 2025  1:58 PM Angela wrote:  Type:  Needs Medical Advice    Who Called: Ms Kirkland   Would the patient rather a call back or a response via [facility]? Call   Best Call Back Number: [phone]   Additional Information: She states that she will be sending the provider a message on the portal later on this evening and she needs her to read it and respond back to her please  and she is also, asking for Justyna to give her a call back please call

## 2025-06-17 ENCOUNTER — PATIENT MESSAGE (OUTPATIENT)
Dept: GASTROENTEROLOGY | Facility: CLINIC | Age: 70
End: 2025-06-17
Payer: MEDICARE

## 2025-06-19 ENCOUNTER — PATIENT MESSAGE (OUTPATIENT)
Dept: GASTROENTEROLOGY | Facility: CLINIC | Age: 70
End: 2025-06-19
Payer: MEDICARE

## 2025-06-19 ENCOUNTER — TELEPHONE (OUTPATIENT)
Dept: GASTROENTEROLOGY | Facility: CLINIC | Age: 70
End: 2025-06-19
Payer: MEDICARE

## 2025-06-19 NOTE — TELEPHONE ENCOUNTER
Copied from CRM #[id number]. Topic: General Inquiry - Patient Advice  >> Jun 19, 2025  1:09 PM Natasha wrote:  Pt is calling to speak with Petrona concerning medication (lenzest) need it called in today, asking for call back

## 2025-06-19 NOTE — TELEPHONE ENCOUNTER
Copied from CRM #[id number]. Topic: Medications - Medication Refill  >> Jun 19, 2025 12:14 PM Kate wrote:               Is this a Refill or New Rx: Refill         Rx Name and Strength:linaCLOtide (LINZESS) 145 mcg Cap capsule         Preferred Pharmacy with phone number:  [facility] - [address] [address]  Phone: [phone] Fax: [phone]           Communication Preference: Pt @[phone]

## 2025-06-20 ENCOUNTER — PATIENT MESSAGE (OUTPATIENT)
Dept: GASTROENTEROLOGY | Facility: CLINIC | Age: 70
End: 2025-06-20
Payer: MEDICARE

## 2025-06-20 NOTE — TELEPHONE ENCOUNTER
"1.She wants to know if you got a copy of the EGD from [facility]    2. Wants to know if you can give something for nausea. She says she has nausea all day/every day.     3. The provider in [place] cancelled her Linzess Rx. They told her to take Metamucil. She does not want to take Metamucil as she says it can cause an obstruction.     She says she has not had a bowel movement in several days. However, she does not feel constipated/ She is concerned that she will get an obstruction or perforated bowel.     She went to a surgeon for the J-tube. But he told her her intestine is "like spaghetti"      She says she feels alone in her struggle. She would like a call from you.      "

## 2025-06-20 NOTE — TELEPHONE ENCOUNTER
Copied from CRM #[id number]. Topic: General Inquiry - Patient Advice  >> Jun 20, 2025  1:11 PM Kate wrote:  Good Afternoon,    Pt is calling to ask if someone can reach out to her, pt did not give further info. Thanks     Pt @[phone]

## 2025-06-23 ENCOUNTER — TELEPHONE (OUTPATIENT)
Dept: GASTROENTEROLOGY | Facility: CLINIC | Age: 70
End: 2025-06-23
Payer: MEDICARE

## 2025-06-23 NOTE — TELEPHONE ENCOUNTER
Copied from CRM #[id number]. Topic: General Inquiry - Patient Advice  >> Jun 23, 2025  8:22 AM Med Assistant Brennen wrote:  Type:  Needs Medical Advice    Who Called: Kerrie  Would the patient rather a call back or a response via [facility]? Call back  Best Call Back Number: [phone]   Additional Information: Pt states that she would like to follow up in the message that was sent last week and that she would like for him to send a surgical referral GI

## 2025-07-08 ENCOUNTER — PATIENT MESSAGE (OUTPATIENT)
Dept: GASTROENTEROLOGY | Facility: CLINIC | Age: 70
End: 2025-07-08
Payer: MEDICARE

## 2025-07-08 ENCOUNTER — TELEPHONE (OUTPATIENT)
Dept: GASTROENTEROLOGY | Facility: CLINIC | Age: 70
End: 2025-07-08
Payer: MEDICARE

## 2025-07-08 NOTE — TELEPHONE ENCOUNTER
Copied from CRM #[id number]. Topic: General Inquiry - Patient Advice  >> Jul 8, 2025 11:40 AM Gregorio wrote:  Hi,    Who called: The pt       Reason: Hi, pt called to request a call from the provider to discuss speaking with her Pain Management provider(Pt say's the provider was to take care of that on 04/30/25 and has not done so). Pls call her at [phone](Pt is asking for Petrona to call her).   She say's she will send information to the protal shortly.     Provider's name:An Moreau MD      Additional Information: Thank you.

## 2025-07-25 ENCOUNTER — TELEPHONE (OUTPATIENT)
Dept: GASTROENTEROLOGY | Facility: CLINIC | Age: 70
End: 2025-07-25
Payer: MEDICARE

## 2025-07-25 ENCOUNTER — PATIENT MESSAGE (OUTPATIENT)
Dept: GASTROENTEROLOGY | Facility: CLINIC | Age: 70
End: 2025-07-25
Payer: MEDICARE

## 2025-07-25 NOTE — TELEPHONE ENCOUNTER
Copied from CRM #[id number]. Topic: General Inquiry - Patient Advice  >> Jul 25, 2025  8:13 AM Kanika Vera wrote:  Type:  Needs Medical Advice    Who Called: pt is calling to determine if she cn change her appt to virtual instead.      Would the patient rather a call back or a response via [facility]? Please contact the pt first if unable to reach please leave a detail message thank you.    Best Call Back Number: [phone] (M)

## 2025-07-25 NOTE — TELEPHONE ENCOUNTER
Spoke with patient. Unfortunately, Dr Moreau cannot do virtual visits. Also, patient is not in [place]